# Patient Record
Sex: FEMALE | Race: WHITE | NOT HISPANIC OR LATINO | Employment: OTHER | URBAN - METROPOLITAN AREA
[De-identification: names, ages, dates, MRNs, and addresses within clinical notes are randomized per-mention and may not be internally consistent; named-entity substitution may affect disease eponyms.]

---

## 2023-04-14 ENCOUNTER — TELEPHONE (OUTPATIENT)
Dept: PRIMARY CARE | Facility: CLINIC | Age: 88
End: 2023-04-14
Payer: MEDICARE

## 2023-04-14 DIAGNOSIS — F41.9 ANXIETY: Primary | ICD-10-CM

## 2023-04-14 RX ORDER — ONDANSETRON HYDROCHLORIDE 8 MG/1
8 TABLET, FILM COATED ORAL EVERY 8 HOURS PRN
COMMUNITY
Start: 2022-12-28

## 2023-04-14 RX ORDER — OLANZAPINE 2.5 MG/1
1 TABLET ORAL DAILY
COMMUNITY
Start: 2023-01-18 | End: 2023-04-21 | Stop reason: SDUPTHER

## 2023-04-14 NOTE — TELEPHONE ENCOUNTER
Pharmacy called to ask about the strength of the fluoxitine prescribed for pt.  Please call to discuss.

## 2023-05-05 RX ORDER — OLANZAPINE 2.5 MG/1
2.5 TABLET ORAL DAILY
Qty: 90 TABLET | Refills: 1 | Status: SHIPPED | OUTPATIENT
Start: 2023-05-05 | End: 2023-05-08 | Stop reason: ALTCHOICE

## 2023-05-08 ENCOUNTER — OFFICE VISIT (OUTPATIENT)
Dept: PRIMARY CARE | Facility: CLINIC | Age: 88
End: 2023-05-08
Payer: MEDICARE

## 2023-05-08 VITALS
OXYGEN SATURATION: 97 % | RESPIRATION RATE: 18 BRPM | BODY MASS INDEX: 21.66 KG/M2 | HEART RATE: 84 BPM | HEIGHT: 67 IN | TEMPERATURE: 98 F | WEIGHT: 138 LBS | SYSTOLIC BLOOD PRESSURE: 104 MMHG | DIASTOLIC BLOOD PRESSURE: 64 MMHG

## 2023-05-08 DIAGNOSIS — F41.9 ANXIETY: Primary | ICD-10-CM

## 2023-05-08 PROCEDURE — 1036F TOBACCO NON-USER: CPT | Performed by: FAMILY MEDICINE

## 2023-05-08 PROCEDURE — 99213 OFFICE O/P EST LOW 20 MIN: CPT | Performed by: FAMILY MEDICINE

## 2023-05-08 PROCEDURE — 1159F MED LIST DOCD IN RCRD: CPT | Performed by: FAMILY MEDICINE

## 2023-05-08 RX ORDER — FLUOXETINE HYDROCHLORIDE 20 MG/1
20 CAPSULE ORAL DAILY
COMMUNITY
Start: 2023-01-12 | End: 2023-05-08 | Stop reason: ALTCHOICE

## 2023-05-08 RX ORDER — CALCITRIOL 0.25 UG/1
0.25 CAPSULE ORAL DAILY
COMMUNITY
Start: 2023-04-28

## 2023-05-08 RX ORDER — METOPROLOL TARTRATE 25 MG/1
25 TABLET, FILM COATED ORAL 2 TIMES DAILY
COMMUNITY
Start: 2018-07-25

## 2023-05-08 RX ORDER — SERTRALINE HYDROCHLORIDE 50 MG/1
50 TABLET, FILM COATED ORAL DAILY
Qty: 30 TABLET | Refills: 1 | Status: SHIPPED | OUTPATIENT
Start: 2023-05-08 | End: 2023-06-12 | Stop reason: SINTOL

## 2023-05-08 NOTE — PROGRESS NOTES
"Subjective   Patient ID: Sue Hutchinson is a 88 y.o. female who presents for Weight Loss, Nausea, no apetite, and Anxiety (Anxiety meds could be affecting apetite.).      HPI Thinks all of her symptoms are side effects from the SSRI. Is interested in trying a different one.    Review of Systems As per HPI    Objective   /64   Pulse 84   Temp 36.7 °C (98 °F)   Resp 18   Ht 1.702 m (5' 7\")   Wt 62.6 kg (138 lb)   SpO2 97%   BMI 21.61 kg/m²     Physical Exam  Constitutional:       Appearance: Normal appearance.   HENT:      Head: Normocephalic and atraumatic.      Right Ear: Tympanic membrane normal.      Left Ear: Tympanic membrane normal.      Nose: Nose normal.      Mouth/Throat:      Mouth: Mucous membranes are moist.   Cardiovascular:      Rate and Rhythm: Normal rate. Rhythm irregular.   Pulmonary:      Effort: Pulmonary effort is normal.      Breath sounds: Normal breath sounds.   Neurological:      Mental Status: She is alert.         Assessment/Plan   Problem List Items Addressed This Visit    None  Visit Diagnoses       Anxiety    -  Primary    Relevant Medications    sertraline (Zoloft) 50 mg tablet               "

## 2023-05-17 ENCOUNTER — NURSING HOME VISIT (OUTPATIENT)
Dept: PRIMARY CARE | Facility: CLINIC | Age: 88
End: 2023-05-17

## 2023-05-17 ENCOUNTER — NURSING HOME VISIT (OUTPATIENT)
Dept: POST ACUTE CARE | Facility: EXTERNAL LOCATION | Age: 88
End: 2023-05-17
Payer: MEDICARE

## 2023-05-17 DIAGNOSIS — N18.4 STAGE 4 CHRONIC KIDNEY DISEASE (MULTI): ICD-10-CM

## 2023-05-17 DIAGNOSIS — F32.A DEPRESSION, UNSPECIFIED DEPRESSION TYPE: ICD-10-CM

## 2023-05-17 DIAGNOSIS — C67.9 MALIGNANT NEOPLASM OF URINARY BLADDER, UNSPECIFIED SITE (MULTI): ICD-10-CM

## 2023-05-17 DIAGNOSIS — I48.91 ATRIAL FIBRILLATION, UNSPECIFIED TYPE (MULTI): ICD-10-CM

## 2023-05-17 DIAGNOSIS — M19.90 OSTEOARTHRITIS, UNSPECIFIED OSTEOARTHRITIS TYPE, UNSPECIFIED SITE: ICD-10-CM

## 2023-05-17 DIAGNOSIS — Z93.6 PRESENCE OF UROSTOMY (MULTI): ICD-10-CM

## 2023-05-17 DIAGNOSIS — I10 HYPERTENSION, UNSPECIFIED TYPE: ICD-10-CM

## 2023-05-17 DIAGNOSIS — F41.1 GAD (GENERALIZED ANXIETY DISORDER): ICD-10-CM

## 2023-05-17 DIAGNOSIS — R33.9 URINARY RETENTION: Primary | ICD-10-CM

## 2023-05-17 DIAGNOSIS — Z79.01 CURRENT USE OF LONG TERM ANTICOAGULATION: ICD-10-CM

## 2023-05-17 PROCEDURE — 99306 1ST NF CARE HIGH MDM 50: CPT | Performed by: FAMILY MEDICINE

## 2023-05-17 NOTE — PROGRESS NOTES
Documentation at [facility] skilled nursing [facility]    Problem List Items Addressed This Visit          Circulatory    Atrial fibrillation (CMS/HCC)    Hypertension       Genitourinary    Urinary retention - Primary    Stage 4 chronic kidney disease (CMS/HCC)    Malignant neoplasm of urinary bladder (CMS/HCC)       Other    ALICIA (generalized anxiety disorder)    Depression    Current use of long term anticoagulation    Presence of urostomy (CMS/HCC)   Discussed with daughter

## 2023-05-17 NOTE — LETTER
Patient: Sue Hutchinson  : 1934    Encounter Date: 2023    Documentation at [facility]    Problem List Items Addressed This Visit          Circulatory    Atrial fibrillation (CMS/HCC)    Hypertension       Genitourinary    Urinary retention - Primary    Stage 4 chronic kidney disease (CMS/HCC)    Malignant neoplasm of urinary bladder (CMS/HCC)       Other    ALICIA (generalized anxiety disorder)    Depression    Current use of long term anticoagulation    Presence of urostomy (CMS/HCC)   Discussed with daughter      Electronically Signed By: Jag Carbajal DO   23  1:36 PM

## 2023-05-25 ENCOUNTER — TELEPHONE (OUTPATIENT)
Dept: PRIMARY CARE | Facility: CLINIC | Age: 88
End: 2023-05-25
Payer: MEDICARE

## 2023-05-25 NOTE — TELEPHONE ENCOUNTER
Cathy from [facility] healthcare is calling because PT is being discharged from [facility] today and they would like to know if Dr. Carreno will follow for home helathcare services of nursing,  PT and OT.  Looking for a verbal order .  They want to start services tomorrow or Saturday.

## 2023-05-26 ENCOUNTER — PATIENT OUTREACH (OUTPATIENT)
Dept: PRIMARY CARE | Facility: CLINIC | Age: 88
End: 2023-05-26
Payer: MEDICARE

## 2023-05-26 ENCOUNTER — DOCUMENTATION (OUTPATIENT)
Dept: PRIMARY CARE | Facility: CLINIC | Age: 88
End: 2023-05-26
Payer: MEDICARE

## 2023-05-26 NOTE — PROGRESS NOTES
Discharge Facility:[facility]  Discharge Diagnosis: Acute kidney injury, Bladder cancer, Urinary   retention   Admission Date:5/16/2023  Discharge Date: 5/25/2023    PCP Appointment Date:none  Specialist Appointment Date: none  Hospital Encounter and Summary:  not available at this time

## 2023-06-07 ENCOUNTER — PATIENT OUTREACH (OUTPATIENT)
Dept: PRIMARY CARE | Facility: CLINIC | Age: 88
End: 2023-06-07
Payer: MEDICARE

## 2023-06-07 NOTE — PROGRESS NOTES
Unable to reach patient for call back 14 days after patient's hospital discharge. No follow up appointment with PCP.   LVM with call back number for patient to call if needed   If no voicemail available call attempts x 2 were made to contact the patient to assist with any questions or concerns patient may have.

## 2023-06-12 ENCOUNTER — OFFICE VISIT (OUTPATIENT)
Dept: PRIMARY CARE | Facility: CLINIC | Age: 88
End: 2023-06-12
Payer: MEDICARE

## 2023-06-12 VITALS
RESPIRATION RATE: 16 BRPM | OXYGEN SATURATION: 97 % | BODY MASS INDEX: 22.6 KG/M2 | TEMPERATURE: 97.9 F | WEIGHT: 144 LBS | SYSTOLIC BLOOD PRESSURE: 114 MMHG | DIASTOLIC BLOOD PRESSURE: 64 MMHG | HEIGHT: 67 IN | HEART RATE: 80 BPM

## 2023-06-12 DIAGNOSIS — Z79.01 CURRENT USE OF LONG TERM ANTICOAGULATION: ICD-10-CM

## 2023-06-12 DIAGNOSIS — F32.A DEPRESSION, UNSPECIFIED DEPRESSION TYPE: ICD-10-CM

## 2023-06-12 DIAGNOSIS — I48.11 LONGSTANDING PERSISTENT ATRIAL FIBRILLATION (MULTI): Primary | ICD-10-CM

## 2023-06-12 DIAGNOSIS — I10 HYPERTENSION, UNSPECIFIED TYPE: ICD-10-CM

## 2023-06-12 PROCEDURE — 99213 OFFICE O/P EST LOW 20 MIN: CPT | Performed by: FAMILY MEDICINE

## 2023-06-12 PROCEDURE — 1159F MED LIST DOCD IN RCRD: CPT | Performed by: FAMILY MEDICINE

## 2023-06-12 PROCEDURE — 3078F DIAST BP <80 MM HG: CPT | Performed by: FAMILY MEDICINE

## 2023-06-12 PROCEDURE — 1036F TOBACCO NON-USER: CPT | Performed by: FAMILY MEDICINE

## 2023-06-12 PROCEDURE — 3074F SYST BP LT 130 MM HG: CPT | Performed by: FAMILY MEDICINE

## 2023-06-12 RX ORDER — MIRTAZAPINE 7.5 MG/1
7.5 TABLET, FILM COATED ORAL DAILY
Qty: 90 TABLET | Refills: 3 | Status: SHIPPED | OUTPATIENT
Start: 2023-06-12 | End: 2024-06-11

## 2023-06-12 RX ORDER — SODIUM BICARBONATE 650 MG/1
TABLET ORAL
COMMUNITY

## 2023-06-12 RX ORDER — SERTRALINE HYDROCHLORIDE 25 MG/1
25 TABLET, FILM COATED ORAL DAILY
Qty: 90 TABLET | Refills: 3 | Status: SHIPPED | OUTPATIENT
Start: 2023-06-12 | End: 2024-06-11

## 2023-06-12 RX ORDER — MIRTAZAPINE 7.5 MG/1
7.5 TABLET, FILM COATED ORAL DAILY
COMMUNITY
Start: 2023-05-25 | End: 2023-06-12 | Stop reason: SDUPTHER

## 2023-06-12 RX ORDER — SERTRALINE HYDROCHLORIDE 25 MG/1
25 TABLET, FILM COATED ORAL DAILY
COMMUNITY
Start: 2023-05-25 | End: 2023-06-12 | Stop reason: SDUPTHER

## 2023-06-12 NOTE — PROGRESS NOTES
"Subjective   Patient ID: Sue Hutchinson is a 88 y.o. female who presents for Hospital Follow-up (Failure to thrive , Urinary retention, Atrial fibrillation,  Hypertension and depression. /).    HPI In for three days. Did not have PT see her and she was like \"a wet noodle\" Was sent to [facility] in  for rehab afterwards. Is able to get up a few steps.       Objective   /64 (BP Location: Left arm, Patient Position: Sitting)   Pulse 80   Temp 36.6 °C (97.9 °F)   Resp 16   Ht 1.702 m (5' 7\")   Wt 65.3 kg (144 lb)   SpO2 97%   BMI 22.55 kg/m²     Physical Exam  Constitutional:       Appearance: Normal appearance.   HENT:      Head: Normocephalic and atraumatic.      Right Ear: Tympanic membrane normal.      Left Ear: Tympanic membrane normal.      Nose: Nose normal.      Mouth/Throat:      Mouth: Mucous membranes are moist.   Cardiovascular:      Rate and Rhythm: Normal rate and regular rhythm.   Pulmonary:      Effort: Pulmonary effort is normal.      Breath sounds: Normal breath sounds.   Neurological:      Mental Status: She is alert.         Assessment/Plan   Problem List Items Addressed This Visit       Atrial fibrillation (CMS/HCC) - Primary    Hypertension    Depression    Relevant Medications    mirtazapine (Remeron) 7.5 mg tablet    sertraline (Zoloft) 25 mg tablet    Current use of long term anticoagulation          "

## 2023-07-07 ENCOUNTER — PATIENT OUTREACH (OUTPATIENT)
Dept: PRIMARY CARE | Facility: CLINIC | Age: 88
End: 2023-07-07
Payer: MEDICARE

## 2023-07-17 LAB
ALBUMIN (G/DL) IN SER/PLAS: 3.8 G/DL (ref 3.4–5)
AMORPHOUS CRYSTALS, URINE: ABNORMAL /HPF
ANION GAP IN SER/PLAS: 13 MMOL/L (ref 10–20)
BACTERIA, URINE: ABNORMAL /HPF
BASOPHILS (10*3/UL) IN BLOOD BY AUTOMATED COUNT: 0.08 X10E9/L (ref 0–0.1)
BASOPHILS/100 LEUKOCYTES IN BLOOD BY AUTOMATED COUNT: 1.1 % (ref 0–2)
CALCIUM (MG/DL) IN SER/PLAS: 9.5 MG/DL (ref 8.6–10.3)
CARBON DIOXIDE, TOTAL (MMOL/L) IN SER/PLAS: 28 MMOL/L (ref 21–32)
CHLORIDE (MMOL/L) IN SER/PLAS: 105 MMOL/L (ref 98–107)
CREATININE (MG/DL) IN SER/PLAS: 1.54 MG/DL (ref 0.5–1.05)
EOSINOPHILS (10*3/UL) IN BLOOD BY AUTOMATED COUNT: 0.29 X10E9/L (ref 0–0.4)
EOSINOPHILS/100 LEUKOCYTES IN BLOOD BY AUTOMATED COUNT: 3.8 % (ref 0–6)
ERYTHROCYTE DISTRIBUTION WIDTH (RATIO) BY AUTOMATED COUNT: 12 % (ref 11.5–14.5)
ERYTHROCYTE MEAN CORPUSCULAR HEMOGLOBIN CONCENTRATION (G/DL) BY AUTOMATED: 31.8 G/DL (ref 32–36)
ERYTHROCYTE MEAN CORPUSCULAR VOLUME (FL) BY AUTOMATED COUNT: 93 FL (ref 80–100)
ERYTHROCYTES (10*6/UL) IN BLOOD BY AUTOMATED COUNT: 4.29 X10E12/L (ref 4–5.2)
GFR FEMALE: 32 ML/MIN/1.73M2
GLUCOSE (MG/DL) IN SER/PLAS: 101 MG/DL (ref 74–99)
HEMATOCRIT (%) IN BLOOD BY AUTOMATED COUNT: 40 % (ref 36–46)
HEMOGLOBIN (G/DL) IN BLOOD: 12.7 G/DL (ref 12–16)
IMMATURE GRANULOCYTES/100 LEUKOCYTES IN BLOOD BY AUTOMATED COUNT: 0.3 % (ref 0–0.9)
LEUKOCYTES (10*3/UL) IN BLOOD BY AUTOMATED COUNT: 7.6 X10E9/L (ref 4.4–11.3)
LYMPHOCYTES (10*3/UL) IN BLOOD BY AUTOMATED COUNT: 1.57 X10E9/L (ref 0.8–3)
LYMPHOCYTES/100 LEUKOCYTES IN BLOOD BY AUTOMATED COUNT: 20.7 % (ref 13–44)
MAGNESIUM (MG/DL) IN SER/PLAS: 1.89 MG/DL (ref 1.6–2.4)
MONOCYTES (10*3/UL) IN BLOOD BY AUTOMATED COUNT: 0.92 X10E9/L (ref 0.05–0.8)
MONOCYTES/100 LEUKOCYTES IN BLOOD BY AUTOMATED COUNT: 12.1 % (ref 2–10)
NEUTROPHILS (10*3/UL) IN BLOOD BY AUTOMATED COUNT: 4.71 X10E9/L (ref 1.6–5.5)
NEUTROPHILS/100 LEUKOCYTES IN BLOOD BY AUTOMATED COUNT: 62 % (ref 40–80)
PHOSPHATE (MG/DL) IN SER/PLAS: 3.7 MG/DL (ref 2.5–4.9)
PLATELETS (10*3/UL) IN BLOOD AUTOMATED COUNT: 300 X10E9/L (ref 150–450)
POTASSIUM (MMOL/L) IN SER/PLAS: 4.2 MMOL/L (ref 3.5–5.3)
RBC, URINE: 7 /HPF (ref 0–5)
SODIUM (MMOL/L) IN SER/PLAS: 142 MMOL/L (ref 136–145)
SQUAMOUS EPITHELIAL CELLS, URINE: <1 /HPF
URATE (MG/DL) IN SER/PLAS: 6.2 MG/DL (ref 2.3–6.7)
UREA NITROGEN (MG/DL) IN SER/PLAS: 28 MG/DL (ref 6–23)
WBC CLUMPS, URINE: ABNORMAL /HPF
WBC, URINE: 80 /HPF (ref 0–5)

## 2023-08-25 ENCOUNTER — PATIENT OUTREACH (OUTPATIENT)
Dept: PRIMARY CARE | Facility: CLINIC | Age: 88
End: 2023-08-25
Payer: MEDICARE

## 2023-10-05 ENCOUNTER — LAB (OUTPATIENT)
Dept: LAB | Facility: LAB | Age: 88
End: 2023-10-05
Payer: MEDICARE

## 2023-10-05 DIAGNOSIS — N18.2 CHRONIC KIDNEY DISEASE, STAGE 2 (MILD): Primary | ICD-10-CM

## 2023-10-05 LAB
ALBUMIN SERPL BCP-MCNC: 3.8 G/DL (ref 3.4–5)
ANION GAP SERPL CALC-SCNC: 12 MMOL/L (ref 10–20)
BASOPHILS # BLD AUTO: 0.08 X10*3/UL (ref 0–0.1)
BASOPHILS NFR BLD AUTO: 1 %
BUN SERPL-MCNC: 28 MG/DL (ref 6–23)
CALCIUM SERPL-MCNC: 9.6 MG/DL (ref 8.6–10.3)
CHLORIDE SERPL-SCNC: 111 MMOL/L (ref 98–107)
CO2 SERPL-SCNC: 28 MMOL/L (ref 21–32)
CREAT SERPL-MCNC: 1.56 MG/DL (ref 0.5–1.05)
EOSINOPHIL # BLD AUTO: 0.31 X10*3/UL (ref 0–0.4)
EOSINOPHIL NFR BLD AUTO: 3.9 %
ERYTHROCYTE [DISTWIDTH] IN BLOOD BY AUTOMATED COUNT: 13.2 % (ref 11.5–14.5)
GFR SERPL CREATININE-BSD FRML MDRD: 32 ML/MIN/1.73M*2
GLUCOSE SERPL-MCNC: 105 MG/DL (ref 74–99)
HCT VFR BLD AUTO: 40.8 % (ref 36–46)
HGB BLD-MCNC: 12.9 G/DL (ref 12–16)
IMM GRANULOCYTES # BLD AUTO: 0.03 X10*3/UL (ref 0–0.5)
IMM GRANULOCYTES NFR BLD AUTO: 0.4 % (ref 0–0.9)
LYMPHOCYTES # BLD AUTO: 1.5 X10*3/UL (ref 0.8–3)
LYMPHOCYTES NFR BLD AUTO: 18.6 %
MAGNESIUM SERPL-MCNC: 1.95 MG/DL (ref 1.6–2.4)
MCH RBC QN AUTO: 28.9 PG (ref 26–34)
MCHC RBC AUTO-ENTMCNC: 31.6 G/DL (ref 32–36)
MCV RBC AUTO: 92 FL (ref 80–100)
MONOCYTES # BLD AUTO: 0.78 X10*3/UL (ref 0.05–0.8)
MONOCYTES NFR BLD AUTO: 9.7 %
NEUTROPHILS # BLD AUTO: 5.35 X10*3/UL (ref 1.6–5.5)
NEUTROPHILS NFR BLD AUTO: 66.4 %
NRBC BLD-RTO: 0 /100 WBCS (ref 0–0)
PHOSPHATE SERPL-MCNC: 4 MG/DL (ref 2.5–4.9)
PLATELET # BLD AUTO: 317 X10*3/UL (ref 150–450)
PMV BLD AUTO: 11.6 FL (ref 7.5–11.5)
POTASSIUM SERPL-SCNC: 4.5 MMOL/L (ref 3.5–5.3)
RBC # BLD AUTO: 4.46 X10*6/UL (ref 4–5.2)
SODIUM SERPL-SCNC: 146 MMOL/L (ref 136–145)
URATE SERPL-MCNC: 6 MG/DL (ref 2.3–6.7)
WBC # BLD AUTO: 8.1 X10*3/UL (ref 4.4–11.3)

## 2023-10-05 PROCEDURE — 85025 COMPLETE CBC W/AUTO DIFF WBC: CPT

## 2023-10-05 PROCEDURE — 36415 COLL VENOUS BLD VENIPUNCTURE: CPT

## 2023-10-05 PROCEDURE — 80069 RENAL FUNCTION PANEL: CPT

## 2023-10-05 PROCEDURE — 83735 ASSAY OF MAGNESIUM: CPT

## 2023-10-05 PROCEDURE — 84550 ASSAY OF BLOOD/URIC ACID: CPT

## 2023-10-27 ENCOUNTER — TELEPHONE (OUTPATIENT)
Dept: PRIMARY CARE | Facility: CLINIC | Age: 88
End: 2023-10-27
Payer: MEDICARE

## 2023-10-27 NOTE — TELEPHONE ENCOUNTER
Pt states that Dr. Carreno referred her to Dr. Torres for plastic surgery referral. Pt says there are multiple doctors with that name and is requesting more detail so she can schedule with the correct person. Thank you.       [phone]

## 2023-11-02 ENCOUNTER — TELEPHONE (OUTPATIENT)
Dept: PRIMARY CARE | Facility: CLINIC | Age: 88
End: 2023-11-02
Payer: MEDICARE

## 2023-11-02 NOTE — TELEPHONE ENCOUNTER
Per pt, she was unable to find the plastic surgeon with the last name Sofia, there are quite a few doctors with very similar spelling with the same specialty. She is asking for his full name and location, please advise.   Tel [phone]

## 2023-11-05 ENCOUNTER — PHARMACY VISIT (OUTPATIENT)
Dept: PHARMACY | Facility: CLINIC | Age: 88
End: 2023-11-05
Payer: COMMERCIAL

## 2023-11-14 ENCOUNTER — TELEMEDICINE (OUTPATIENT)
Dept: PHARMACY | Facility: HOSPITAL | Age: 88
End: 2023-11-14
Payer: MEDICARE

## 2023-11-14 DIAGNOSIS — I48.11 LONGSTANDING PERSISTENT ATRIAL FIBRILLATION (MULTI): Primary | ICD-10-CM

## 2023-11-14 NOTE — Clinical Note
JIN Wood! My resident completed 3 month follow up with patient regarding Eliquis. No issues to report. Will continue to follow

## 2023-11-14 NOTE — PROGRESS NOTES
"Pharmacist Clinic: Anticoagulation Management  Sue Hutchinson was referred to the Clinical Pharmacy Team for her anticoagulation management.    Referring Provider:  Tracy Schwab    Last Appointment w/ Pharmacist: 7/14/2023  Pharmacist Name: Tammy Garrett  _______________________________________________________________________  PHARMACY ASSESSMENT    Review of Past Appointment:   - At last appointment with patient's daughter (patient is hard of hearing), patient was screened for and enrolled in [facility] for Eliquis as it was becoming too expensive.    RELEVANT LAB RESULTS  Lab Results   Component Value Date    BILITOT 2.5 (H) 05/11/2023    CALCIUM 9.6 10/05/2023    CO2 28 10/05/2023     (H) 10/05/2023    CREATININE 1.56 (H) 10/05/2023    GLUCOSE 105 (H) 10/05/2023    ALKPHOS 37 05/11/2023    K 4.5 10/05/2023    PROT 6.7 05/11/2023     (H) 10/05/2023    AST 17 05/11/2023    ALT 13 05/11/2023    BUN 28 (H) 10/05/2023    ANIONGAP 12 10/05/2023    MG 1.95 10/05/2023    PHOS 4.0 10/05/2023    ALBUMIN 3.8 10/05/2023    GFRF 32 (A) 07/17/2023     Lab Results   Component Value Date    TRIG 195 (H) 03/09/2021    CHOL 149 03/09/2021    HDL 31.0 (A) 03/09/2021     No results found for: \"BMCBC\", \"CBCDIF\"   _______________________________________________________________________  ANTICOAGULATION ASSESSMENT    The ASCVD Risk score (Alexandra RAMEY, et al., 2019) failed to calculate for the following reasons:    The 2019 ASCVD risk score is only valid for ages 40 to 79    DIAGNOSIS: prevention of nonvalvular atrial fibrilliation stroke and systemic embolism  - Patient is projected to be on anticoagulation indefinitely   - [id number] Score: [4] (only included if diagnosis is atrial fibrillation)   Age: [<65 (0)] [65-74 (+1)] [> 75 (+2)]: 2  Sex: [Male/Female (+1)]: 1  CHF history: [No/Yes(+1)]: 0  Hypertension history: [No/Yes(+1)]: 1  Stroke/TIA/thromboembolism history: [No/Yes(+2)]: 0  Vascular disease history (prior MI, " peripheral artery disease, aortic plaque): [No/Yes(+1)]: 0  Diabetes history: [No/Yes(+1)]: 0    CURRENT PHARMACOTHERAPY:   - Eliquis 2.5mg 1 tablet by mouth twice daily  - Dosage is appropriate for patient's age (89), weight (64.5kg), and renal function (serum creatinine 1.54 mg/dL)    UPDATE ON PHARMACOTHERAPY:   Affordability/Accessibility: [facility]  Adherence/Organization: denies issues  Adverse Effects: denies bleeding/bruising   Recent Hospitalizations: none  Recent Falls/Trauma: none  Changes in Tobacco or Alcohol Intake: none    DISCUSSION/NOTES:  - Spoke with patient's daughter since patient is hard of hearing. States patient is doing well on Eliquis and denies any abnormal bleeding or bruising. States she is not sure if they get the Eliquis delivered or if it is picked up, but denies issues with obtaining medication through [facility].   _______________________________________________________________________  PATIENT EDUCATION/GOALS  - Counseled patient on MOA, expectations, duration of therapy, contraindications, administration, and monitoring parameters  - Counseled patient of side effects that are indicative of bleeding such as dark tarry stool, unexplainable bruising, or vomiting up a coffee ground like substance  - Answered all patient questions and concerns  _______________________________________________________________________  RECOMMENDATIONS/PLAN  1. Patient is doing well on Eliquis and daughter states she is not having any abnormal bruising or bleeding. Will follow up in 3 months.     Next Cardiology Appointment: To be scheduled  Clinical Pharmacist follow up: 2/13/2024  VAF/Application Expiration: Yes    Date: 8/14/2024  Type of Encounter: Bay Aguilar  PGY-1 Pharmacy Resident, [facility]     Verbal consent to manage patient's drug therapy was obtained from the patient . They were informed they may decline to participate or withdraw from participation in pharmacy services at any  time.    Continue all meds under the continuation of care with the referring provider and clinical pharmacy team.

## 2023-11-17 ENCOUNTER — PHARMACY VISIT (OUTPATIENT)
Dept: PHARMACY | Facility: CLINIC | Age: 88
End: 2023-11-17
Payer: MEDICARE

## 2023-11-17 PROCEDURE — RXMED WILLOW AMBULATORY MEDICATION CHARGE

## 2024-02-09 PROCEDURE — RXMED WILLOW AMBULATORY MEDICATION CHARGE

## 2024-02-13 ENCOUNTER — TELEMEDICINE (OUTPATIENT)
Dept: PHARMACY | Facility: HOSPITAL | Age: 89
End: 2024-02-13
Payer: MEDICARE

## 2024-02-13 ENCOUNTER — PHARMACY VISIT (OUTPATIENT)
Dept: PHARMACY | Facility: CLINIC | Age: 89
End: 2024-02-13
Payer: MEDICARE

## 2024-02-13 DIAGNOSIS — I48.11 LONGSTANDING PERSISTENT ATRIAL FIBRILLATION (MULTI): ICD-10-CM

## 2024-02-13 NOTE — Clinical Note
Hemesha Wood! My resident completed a follow up with Sue regarding her Eliquis.  Patient denies adverse effects associated with Eliquis. Patient also denied recent falls and hospitalizations. Overall, patient is doing well on her anticoagulation (Eliquis). Will continue to follow.

## 2024-02-13 NOTE — PROGRESS NOTES
"Pharmacist Clinic: Anticoagulation Management  Sue Hutchinson was referred to the Clinical Pharmacy Team for her anticoagulation management.    Referring Provider:  Tracy Schwab    Last Appointment w/ Pharmacist: 11/14/2023  Pharmacist Name: Caren Cintron, PharmD  _______________________________________________________________________  PHARMACY ASSESSMENT    Review of Past Appointment:   - Patient is doing well on Eliquis and denied any abnormal bleeding or bruising. Patient denied issues with obtaining medication through  PAP.     RELEVANT LAB RESULTS  Lab Results   Component Value Date    BILITOT 2.5 (H) 05/11/2023    CALCIUM 9.6 10/05/2023    CO2 28 10/05/2023     (H) 10/05/2023    CREATININE 1.56 (H) 10/05/2023    GLUCOSE 105 (H) 10/05/2023    ALKPHOS 37 05/11/2023    K 4.5 10/05/2023    PROT 6.7 05/11/2023     (H) 10/05/2023    AST 17 05/11/2023    ALT 13 05/11/2023    BUN 28 (H) 10/05/2023    ANIONGAP 12 10/05/2023    MG 1.95 10/05/2023    PHOS 4.0 10/05/2023    ALBUMIN 3.8 10/05/2023    GFRF 32 (A) 07/17/2023     Lab Results   Component Value Date    TRIG 195 (H) 03/09/2021    CHOL 149 03/09/2021    HDL 31.0 (A) 03/09/2021     No results found for: \"BMCBC\", \"CBCDIF\"   _______________________________________________________________________  ANTICOAGULATION ASSESSMENT    The ASCVD Risk score (Alexandra RAMEY, et al., 2019) failed to calculate for the following reasons:    The 2019 ASCVD risk score is only valid for ages 40 to 79    DIAGNOSIS: prevention of nonvalvular atrial fibrilliation stroke and systemic embolism  - Patient is projected to be on anticoagulation indefinitely  - [id number] Score: [4] (only included if diagnosis is atrial fibrillation)   Age: [<65 (0)] [65-74 (+1)] [> 75 (+2)]: 2  Sex: [Male/Female (+1)]: 1  CHF history: [No/Yes(+1)]: 0  Hypertension history: [No/Yes(+1)]: 1  Stroke/TIA/thromboembolism history: [No/Yes(+2)]: 0  Vascular disease history (prior MI, peripheral artery " disease, aortic plaque): [No/Yes(+1)]: 0  Diabetes history: [No/Yes(+1)]: 0    CURRENT PHARMACOTHERAPY:   - Eliquis 2.5 mg BID  - 88 yo  - 64.5 kg  - Scr: 1.56  - eGFR: 32    UPDATE ON PHARMACOTHERAPY:   Affordability/Accessibility: Patient receives Eliquis through [facility]  Adherence/Organization: Patient stated she forgets to take medication twice a month  Adverse Effects: None  Recent Hospitalizations: None  Recent Falls/Trauma: None  Changes in Tobacco or Alcohol Intake: N/a    DISCUSSION/NOTES:  - Patient denies adverse effects associated with Eliquis. Patient also denied recent falls and hospitalizations. Overall, patient is doing well on her anticoagulation (Eliquis).   _______________________________________________________________________  PATIENT EDUCATION/GOALS  - Counseled patient on MOA, expectations, duration of therapy, contraindications, administration, and monitoring parameters  - Counseled patient of side effects that are indicative of bleeding such as dark tarry stool, unexplainable bruising, or vomiting up a coffee ground like substance  - Answered all patient questions and concerns  _______________________________________________________________________  RECOMMENDATIONS/PLAN  1. Continue Eliquis 2.5 mg BID    Next Cardiology Appointment: TBD  Clinical Pharmacist follow up: 5/13/2024  VAF/Application Expiration: Yes    Date: 8/14/2024  Type of Encounter: Nathan Alvarez PharmD  PGY-1 Pharmacy Resident    Verbal consent to manage patient's drug therapy was obtained from the patient . They were informed they may decline to participate or withdraw from participation in pharmacy services at any time.    Continue all meds under the continuation of care with the referring provider and clinical pharmacy team.

## 2024-05-09 PROCEDURE — RXMED WILLOW AMBULATORY MEDICATION CHARGE

## 2024-05-13 ENCOUNTER — APPOINTMENT (OUTPATIENT)
Dept: PHARMACY | Facility: HOSPITAL | Age: 89
End: 2024-05-13
Payer: MEDICARE

## 2024-05-15 ENCOUNTER — PHARMACY VISIT (OUTPATIENT)
Dept: PHARMACY | Facility: CLINIC | Age: 89
End: 2024-05-15
Payer: MEDICARE

## 2024-05-29 ENCOUNTER — TELEMEDICINE (OUTPATIENT)
Dept: PHARMACY | Facility: HOSPITAL | Age: 89
End: 2024-05-29
Payer: MEDICARE

## 2024-05-29 DIAGNOSIS — I48.11 LONGSTANDING PERSISTENT ATRIAL FIBRILLATION (MULTI): Primary | ICD-10-CM

## 2024-05-29 NOTE — PROGRESS NOTES
"Pharmacist Clinic: Anticoagulation Management  Sue Hutchinson was referred to the Clinical Pharmacy Team for her anticoagulation management.    Referring Provider:  Tracy Schwab, CNP    Last Appointment w/ Pharmacist: 2/13/2024  Pharmacist Name: Alyson Alvarez, PharmD  _______________________________________________________________________  PHARMACY ASSESSMENT    Review of Past Appointment:    PAP approval expires 8/14/2024  Patient doing well overall, reports she may miss a dose of medication twice per month     RELEVANT LAB RESULTS  Lab Results   Component Value Date    BILITOT 2.5 (H) 05/11/2023    CALCIUM 9.6 10/05/2023    CO2 28 10/05/2023     (H) 10/05/2023    CREATININE 1.56 (H) 10/05/2023    GLUCOSE 105 (H) 10/05/2023    ALKPHOS 37 05/11/2023    K 4.5 10/05/2023    PROT 6.7 05/11/2023     (H) 10/05/2023    AST 17 05/11/2023    ALT 13 05/11/2023    BUN 28 (H) 10/05/2023    ANIONGAP 12 10/05/2023    MG 1.95 10/05/2023    PHOS 4.0 10/05/2023    ALBUMIN 3.8 10/05/2023    GFRF 32 (A) 07/17/2023     Lab Results   Component Value Date    TRIG 195 (H) 03/09/2021    CHOL 149 03/09/2021    HDL 31.0 (A) 03/09/2021     No results found for: \"BMCBC\", \"CBCDIF\"   _______________________________________________________________________  ANTICOAGULATION ASSESSMENT    The ASCVD Risk score (Alexandra RAMEY, et al., 2019) failed to calculate for the following reasons:    The 2019 ASCVD risk score is only valid for ages 40 to 79    DIAGNOSIS: prevention of nonvalvular atrial fibrilliation stroke and systemic embolism  - Patient is projected to be on anticoagulation long term  - [id number]-CNSD Score: [4] (only included if diagnosis is atrial fibrillation)   Age: [<65 (0)] [65-74 (+1)] [> 75 (+2)]: 2  Sex: [Male/Female (+1)]: 1  CHF history: [No/Yes(+1)]: 0  Hypertension history: [No/Yes(+1)]: 1  Stroke/TIA/thromboembolism history: [No/Yes(+2)]: 0  Vascular disease history (prior MI, peripheral artery disease, aortic " plaque): [No/Yes(+1)]: 0  Diabetes history: [No/Yes(+1)]: 0    CURRENT PHARMACOTHERAPY:   Eliquis 2.5mg BID  89 y/o  64.5 kg  Scr 1.56  Dose is appropriate for age and kidney function    UPDATE ON PHARMACOTHERAPY:   Affordability/Accessibility:  PAP for Eliquis   Adherence/Organization:  may miss 1 dose once a month   Adverse Effects: no bleeding or bruising   Recent Hospitalizations: none   Recent Falls/Trauma: none  Changes in Tobacco or Alcohol Intake: none    DISCUSSION/NOTES:  Uses walker for ambulation, due to hip pain, is careful to avoid falls, has not fallen since she started consistently using her walker, avoids activities that may increase her risk of falling  Takes short walk every day outside to maintain strength, does some light yard work  Overall patient is doing well on Eliquis, with no bleeding or bruising reported  Counseled on importance of adherence to anticoagulation, and risk of blood clots with missed doses, patient reports overall good compliance    _______________________________________________________________________  PATIENT EDUCATION/GOALS  - Counseled patient on MOA, expectations, duration of therapy, contraindications, administration, and monitoring parameters  - Counseled patient of side effects that are indicative of bleeding such as dark tarry stool, unexplainable bruising, or vomiting up a coffee ground like substance  - Answered all patient questions and concerns  - Discussed timeline for  PAP renewal, explained what documents will be required, timeline for renewal, patient would prefer to complete renewal herself  _______________________________________________________________________  RECOMMENDATIONS/PLAN  Continue: Eliquis 2.5 mg BID  Follow up with pharmacy in 2 months to assess for side effects and medication tolerance, early follow up in order to collect documents to renew  PAP     Refill for Eliquis 2.5mg sent to  [facility]     Next Cardiology Appointment: none  scheduled  Clinical Pharmacist follow up: 7/24/2024 @ 1:00  VAF/Application Expiration: Yes    Date: 8/14/2024  Type of Encounter: Nathan Huynh, PharmD, [facility]  PGY1 [facility] Pharmacy Resident     Verbal consent to manage patient's drug therapy was obtained from the patient . They were informed they may decline to participate or withdraw from participation in pharmacy services at any time.    Continue all meds under the continuation of care with the referring provider and clinical pharmacy team.

## 2024-05-29 NOTE — ASSESSMENT & PLAN NOTE
RECOMMENDATIONS/PLAN  Continue: Eliquis 2.5 mg BID  Follow up with pharmacy in 2 months to assess for side effects and medication tolerance, early follow up in order to collect documents to renew  PAP   Continue all meds under the continuation of care with the referring provider and clinical pharmacy team.

## 2024-06-11 DIAGNOSIS — F32.A DEPRESSION, UNSPECIFIED DEPRESSION TYPE: ICD-10-CM

## 2024-06-11 RX ORDER — MIRTAZAPINE 7.5 MG/1
7.5 TABLET, FILM COATED ORAL DAILY
Qty: 90 TABLET | Refills: 3 | Status: SHIPPED | OUTPATIENT
Start: 2024-06-11 | End: 2025-06-11

## 2024-07-17 ENCOUNTER — LAB (OUTPATIENT)
Dept: LAB | Facility: LAB | Age: 89
End: 2024-07-17
Payer: MEDICARE

## 2024-07-17 DIAGNOSIS — N17.9 ACUTE KIDNEY FAILURE, UNSPECIFIED (CMS-HCC): ICD-10-CM

## 2024-07-17 DIAGNOSIS — N18.30 CHRONIC KIDNEY DISEASE, STAGE 3 UNSPECIFIED (MULTI): Primary | ICD-10-CM

## 2024-07-17 LAB
BASOPHILS # BLD AUTO: 0.07 X10*3/UL (ref 0–0.1)
BASOPHILS NFR BLD AUTO: 0.8 %
EOSINOPHIL # BLD AUTO: 0.22 X10*3/UL (ref 0–0.4)
EOSINOPHIL NFR BLD AUTO: 2.6 %
ERYTHROCYTE [DISTWIDTH] IN BLOOD BY AUTOMATED COUNT: 13.1 % (ref 11.5–14.5)
HCT VFR BLD AUTO: 42.1 % (ref 36–46)
HGB BLD-MCNC: 13.4 G/DL (ref 12–16)
IMM GRANULOCYTES # BLD AUTO: 0.03 X10*3/UL (ref 0–0.5)
IMM GRANULOCYTES NFR BLD AUTO: 0.4 % (ref 0–0.9)
LYMPHOCYTES # BLD AUTO: 1.65 X10*3/UL (ref 0.8–3)
LYMPHOCYTES NFR BLD AUTO: 19.8 %
MCH RBC QN AUTO: 28 PG (ref 26–34)
MCHC RBC AUTO-ENTMCNC: 31.8 G/DL (ref 32–36)
MCV RBC AUTO: 88 FL (ref 80–100)
MONOCYTES # BLD AUTO: 0.75 X10*3/UL (ref 0.05–0.8)
MONOCYTES NFR BLD AUTO: 9 %
NEUTROPHILS # BLD AUTO: 5.62 X10*3/UL (ref 1.6–5.5)
NEUTROPHILS NFR BLD AUTO: 67.4 %
NRBC BLD-RTO: 0 /100 WBCS (ref 0–0)
PLATELET # BLD AUTO: 326 X10*3/UL (ref 150–450)
RBC # BLD AUTO: 4.79 X10*6/UL (ref 4–5.2)
WBC # BLD AUTO: 8.3 X10*3/UL (ref 4.4–11.3)

## 2024-07-17 PROCEDURE — 83970 ASSAY OF PARATHORMONE: CPT

## 2024-07-17 PROCEDURE — 36415 COLL VENOUS BLD VENIPUNCTURE: CPT

## 2024-07-17 PROCEDURE — 83735 ASSAY OF MAGNESIUM: CPT

## 2024-07-17 PROCEDURE — 85025 COMPLETE CBC W/AUTO DIFF WBC: CPT

## 2024-07-17 PROCEDURE — 84550 ASSAY OF BLOOD/URIC ACID: CPT

## 2024-07-17 PROCEDURE — 80069 RENAL FUNCTION PANEL: CPT

## 2024-07-18 LAB
ALBUMIN SERPL BCP-MCNC: 4 G/DL (ref 3.4–5)
ANION GAP SERPL CALC-SCNC: 15 MMOL/L (ref 10–20)
BUN SERPL-MCNC: 37 MG/DL (ref 6–23)
CALCIUM SERPL-MCNC: 9.9 MG/DL (ref 8.6–10.6)
CHLORIDE SERPL-SCNC: 108 MMOL/L (ref 98–107)
CO2 SERPL-SCNC: 23 MMOL/L (ref 21–32)
CREAT SERPL-MCNC: 1.62 MG/DL (ref 0.5–1.05)
EGFRCR SERPLBLD CKD-EPI 2021: 30 ML/MIN/1.73M*2
GLUCOSE SERPL-MCNC: 91 MG/DL (ref 74–99)
MAGNESIUM SERPL-MCNC: 1.97 MG/DL (ref 1.6–2.4)
PHOSPHATE SERPL-MCNC: 3.9 MG/DL (ref 2.5–4.9)
POTASSIUM SERPL-SCNC: 4.9 MMOL/L (ref 3.5–5.3)
PTH-INTACT SERPL-MCNC: 65.9 PG/ML (ref 18.5–88)
SODIUM SERPL-SCNC: 141 MMOL/L (ref 136–145)
URATE SERPL-MCNC: 7.6 MG/DL (ref 2.3–6.7)

## 2024-07-24 ENCOUNTER — APPOINTMENT (OUTPATIENT)
Dept: PHARMACY | Facility: HOSPITAL | Age: 89
End: 2024-07-24
Payer: MEDICARE

## 2024-07-24 ENCOUNTER — APPOINTMENT (OUTPATIENT)
Dept: PRIMARY CARE | Facility: CLINIC | Age: 89
End: 2024-07-24
Payer: MEDICARE

## 2024-07-24 VITALS
TEMPERATURE: 97.8 F | OXYGEN SATURATION: 96 % | HEART RATE: 68 BPM | HEIGHT: 67 IN | BODY MASS INDEX: 22.44 KG/M2 | RESPIRATION RATE: 18 BRPM | SYSTOLIC BLOOD PRESSURE: 112 MMHG | WEIGHT: 143 LBS | DIASTOLIC BLOOD PRESSURE: 66 MMHG

## 2024-07-24 DIAGNOSIS — C67.9 MALIGNANT NEOPLASM OF URINARY BLADDER, UNSPECIFIED SITE (MULTI): ICD-10-CM

## 2024-07-24 DIAGNOSIS — Z93.6 PRESENCE OF UROSTOMY (MULTI): ICD-10-CM

## 2024-07-24 DIAGNOSIS — E46 PROTEIN-CALORIE MALNUTRITION, UNSPECIFIED SEVERITY (MULTI): ICD-10-CM

## 2024-07-24 DIAGNOSIS — I48.11 LONGSTANDING PERSISTENT ATRIAL FIBRILLATION (MULTI): ICD-10-CM

## 2024-07-24 DIAGNOSIS — K43.9 VENTRAL HERNIA WITHOUT OBSTRUCTION OR GANGRENE: ICD-10-CM

## 2024-07-24 DIAGNOSIS — Z00.00 ROUTINE GENERAL MEDICAL EXAMINATION AT HEALTH CARE FACILITY: Primary | ICD-10-CM

## 2024-07-24 DIAGNOSIS — N18.4 STAGE 4 CHRONIC KIDNEY DISEASE (MULTI): ICD-10-CM

## 2024-07-24 LAB
CREAT UR-MCNC: 95.3 MG/DL (ref 20–320)
PROT UR-ACNC: 48 MG/DL (ref 5–24)
PROT/CREAT UR: 0.5 MG/MG CREAT (ref 0–0.17)

## 2024-07-24 PROCEDURE — 1124F ACP DISCUSS-NO DSCNMKR DOCD: CPT | Performed by: FAMILY MEDICINE

## 2024-07-24 PROCEDURE — G0439 PPPS, SUBSEQ VISIT: HCPCS | Performed by: FAMILY MEDICINE

## 2024-07-24 PROCEDURE — 1160F RVW MEDS BY RX/DR IN RCRD: CPT | Performed by: FAMILY MEDICINE

## 2024-07-24 PROCEDURE — 3074F SYST BP LT 130 MM HG: CPT | Performed by: FAMILY MEDICINE

## 2024-07-24 PROCEDURE — 84156 ASSAY OF PROTEIN URINE: CPT

## 2024-07-24 PROCEDURE — 3078F DIAST BP <80 MM HG: CPT | Performed by: FAMILY MEDICINE

## 2024-07-24 PROCEDURE — 1159F MED LIST DOCD IN RCRD: CPT | Performed by: FAMILY MEDICINE

## 2024-07-24 PROCEDURE — G0009 ADMIN PNEUMOCOCCAL VACCINE: HCPCS | Performed by: FAMILY MEDICINE

## 2024-07-24 PROCEDURE — 99397 PER PM REEVAL EST PAT 65+ YR: CPT | Performed by: FAMILY MEDICINE

## 2024-07-24 PROCEDURE — 90677 PCV20 VACCINE IM: CPT | Performed by: FAMILY MEDICINE

## 2024-07-24 PROCEDURE — 1170F FXNL STATUS ASSESSED: CPT | Performed by: FAMILY MEDICINE

## 2024-07-24 PROCEDURE — 1036F TOBACCO NON-USER: CPT | Performed by: FAMILY MEDICINE

## 2024-07-24 PROCEDURE — 82570 ASSAY OF URINE CREATININE: CPT

## 2024-07-24 ASSESSMENT — PATIENT HEALTH QUESTIONNAIRE - PHQ9
2. FEELING DOWN, DEPRESSED OR HOPELESS: SEVERAL DAYS
10. IF YOU CHECKED OFF ANY PROBLEMS, HOW DIFFICULT HAVE THESE PROBLEMS MADE IT FOR YOU TO DO YOUR WORK, TAKE CARE OF THINGS AT HOME, OR GET ALONG WITH OTHER PEOPLE: NOT DIFFICULT AT ALL
1. LITTLE INTEREST OR PLEASURE IN DOING THINGS: SEVERAL DAYS
SUM OF ALL RESPONSES TO PHQ9 QUESTIONS 1 AND 2: 2

## 2024-07-24 ASSESSMENT — ENCOUNTER SYMPTOMS
NEUROLOGICAL NEGATIVE: 1
PSYCHIATRIC NEGATIVE: 1
ABDOMINAL DISTENTION: 1
CONSTITUTIONAL NEGATIVE: 1
EYES NEGATIVE: 1
ENDOCRINE NEGATIVE: 1
RESPIRATORY NEGATIVE: 1
CARDIOVASCULAR NEGATIVE: 1

## 2024-07-24 ASSESSMENT — ACTIVITIES OF DAILY LIVING (ADL)
TAKING_MEDICATION: INDEPENDENT
DOING_HOUSEWORK: NEEDS ASSISTANCE
GROCERY_SHOPPING: NEEDS ASSISTANCE
BATHING: INDEPENDENT
DRESSING: INDEPENDENT
MANAGING_FINANCES: INDEPENDENT

## 2024-07-24 NOTE — PROGRESS NOTES
"Subjective   Reason for Visit: Sue Hutchinson is an 89 y.o. female here for a Medicare Wellness visit.     Past Medical, Surgical, and Family History reviewed and updated in chart.    Reviewed all medications by prescribing practitioner or clinical pharmacist (such as prescriptions, OTCs, herbal therapies and supplements) and documented in the medical record.    HPI Had surgery for a ventral hernia repair 12 years ago. Was complicated by the urostomy. About three years later it started to bother her. Now it is starting to bother her.     Patient Care Team:  Cristina Carreno DO as PCP - General  Cristina Carreno DO as PCP - [employer] PCP     Review of Systems   Constitutional: Negative.    HENT: Negative.     Eyes: Negative.    Respiratory: Negative.     Cardiovascular: Negative.    Gastrointestinal:  Positive for abdominal distention.   Endocrine: Negative.    Genitourinary: Negative.    Skin: Negative.    Neurological: Negative.    Psychiatric/Behavioral: Negative.         Objective   Vitals:  /66 (BP Location: Right arm, Patient Position: Sitting)   Pulse 68   Temp 36.6 °C (97.8 °F) (Temporal)   Resp 18   Ht 1.702 m (5' 7\")   Wt 64.9 kg (143 lb)   SpO2 96%   BMI 22.40 kg/m²       Physical Exam  Constitutional:       General: She is not in acute distress.     Appearance: Normal appearance.   HENT:      Head: Normocephalic and atraumatic.      Right Ear: Tympanic membrane normal.      Left Ear: Tympanic membrane normal.      Nose: Nose normal.      Mouth/Throat:      Mouth: Mucous membranes are dry.      Pharynx: Oropharynx is clear.   Eyes:      Conjunctiva/sclera: Conjunctivae normal.      Pupils: Pupils are equal, round, and reactive to light.   Neck:      Vascular: No carotid bruit.   Cardiovascular:      Rate and Rhythm: Normal rate and regular rhythm.      Heart sounds: Normal heart sounds.   Pulmonary:      Effort: Pulmonary effort is normal.      Breath sounds: Normal " breath sounds.   Abdominal:      General: Bowel sounds are normal.      Palpations: Abdomen is soft.      Comments: Ventral hernia and urostomy in place.    Musculoskeletal:      Cervical back: Neck supple. No tenderness.   Skin:     General: Skin is warm and dry.   Neurological:      General: No focal deficit present.      Mental Status: She is alert.   Psychiatric:         Mood and Affect: Mood normal.         Behavior: Behavior normal.         Assessment/Plan   Problem List Items Addressed This Visit             ICD-10-CM    Atrial fibrillation (Multi) I48.91     Still in afib. Sees Cardiology         Stage 4 chronic kidney disease (Multi) N18.4     Managed by Nephrology         Malignant neoplasm of urinary bladder (Multi) C67.9     Historical         Presence of urostomy (Multi) Z93.6     No issues with  urostomy         Protein-calorie malnutrition, unspecified severity (Multi) - Primary E46     Other Visit Diagnoses         Codes    Routine general medical examination at health care facility     Z00.00

## 2024-08-01 NOTE — PROGRESS NOTES
Pharmacist Clinic: Cardiology Management    Sue Hutchinson is a 89 y.o. female was referred to Clinical Pharmacy Team for anticoagulation management.     Referring Provider: Schwab, Tracy M, APRN-C*    THIS IS A FOLLOW UP PATIENT APPOINTMENT. AT LAST VISIT ON 5/29/2024 WITH PHARMACIST (Sheridan Huynh).    Appointment was completed by patient's daughter who was reached at . Unable to reach patient, left voicemail at .    REVIEW OF PAST APPNT (IF APPLICABLE):   Overall patient is doing well on Eliquis, with no bleeding or bruising reported  Uses walker for ambulation, due to hip pain, is careful to avoid falls, has not fallen since she started consistently using her walker, avoids activities that may increase her risk of falling  Takes short walk every day outside to maintain strength, does some light yard work  Counseled on importance of adherence to anticoagulation, and risk of blood clots with missed doses, patient reports overall good compliance  Of note, patient's [facility] will need renewed prior to 8/14/2024. Patient's last appointment with referring provider was 7/11/2023, she will need to be seen prior to renewal per the C[facility] policy in order for [facility] P[facility] to be renewed and prescriptions submitted by clinical pharmacy. Messaged provider 8/1/2024 for patient to schedule appointment, will inform patient as well.    Allergies   Allergen Reactions    Ciprofloxacin Unknown and Other     tremers       Past Medical History:   Diagnosis Date    Essential (primary) hypertension 01/05/2023    Benign essential hypertension    Personal history of other diseases of the musculoskeletal system and connective tissue     History of arthritis    Personal history of other specified conditions     History of dizziness       Current Outpatient Medications on File Prior to Visit   Medication Sig Dispense Refill    apixaban (Eliquis) 2.5 mg tablet TAKE 1 TABLET BY MOUTH TWICE A DAY. 180 tablet 3    calcitriol  "(Rocaltrol) 0.25 mcg capsule Take 1 capsule (0.25 mcg) by mouth once daily.      metoprolol tartrate (Lopressor) 25 mg tablet Take 1 tablet (25 mg) by mouth 2 times a day. 1/2 BID      mirtazapine (Remeron) 7.5 mg tablet Take 1 tablet (7.5 mg) by mouth once daily. 90 tablet 3    ondansetron (Zofran) 8 mg tablet Take 1 tablet (8 mg) by mouth every 8 hours if needed.      sertraline (Zoloft) 25 mg tablet Take 1 tablet (25 mg) by mouth once daily. 90 tablet 3     No current facility-administered medications on file prior to visit.         RELEVANT LAB RESULTS  Lab Results   Component Value Date    BILITOT 2.5 (H) 05/11/2023    CALCIUM 9.9 07/17/2024    CO2 23 07/17/2024     (H) 07/17/2024    CREATININE 1.62 (H) 07/17/2024    GLUCOSE 91 07/17/2024    ALKPHOS 37 05/11/2023    K 4.9 07/17/2024    PROT 6.7 05/11/2023     07/17/2024    AST 17 05/11/2023    ALT 13 05/11/2023    BUN 37 (H) 07/17/2024    ANIONGAP 15 07/17/2024    MG 1.97 07/17/2024    PHOS 3.9 07/17/2024    ALBUMIN 4.0 07/17/2024    GFRF 32 (A) 07/17/2023     Lab Results   Component Value Date    TRIG 195 (H) 03/09/2021    CHOL 149 03/09/2021    HDL 31.0 (A) 03/09/2021     No results found for: \"BMCBC\", \"CBCDIF\"     PHARMACEUTICAL ASSESSMENT      Medication Documentation Review Audit       Reviewed by Cristina Carreno DO (Physician) on 07/24/24 at 1125      Medication Order Taking? Sig Documenting Provider Last Dose Status   apixaban (Eliquis) 2.5 mg tablet [id number] Yes TAKE 1 TABLET BY MOUTH TWICE A DAY. Tracy M Schwab, APRN-CNP Taking Active   calcitriol (Rocaltrol) 0.25 mcg capsule [id number] Yes Take 1 capsule (0.25 mcg) by mouth once daily. Historical Provider, MD Taking Active   metoprolol tartrate (Lopressor) 25 mg tablet [id number] Yes Take 1 tablet (25 mg) by mouth 2 times a day. 1/2 BID Historical Provider, MD Taking Active   mirtazapine (Remeron) 7.5 mg tablet [id number] Yes Take 1 tablet (7.5 mg) by mouth once daily. Cristina Carreno, DO " Taking Active   ondansetron (Zofran) 8 mg tablet [id number] Yes Take 1 tablet (8 mg) by mouth every 8 hours if needed. Historical Provider, MD Taking Active   sertraline (Zoloft) 25 mg tablet [id number] Yes Take 1 tablet (25 mg) by mouth once daily. Cristina Carreno DO Taking Active      Discontinued 07/24/24 [id number]                    DISEASE MANAGEMENT ASSESSMENT:     ANTICOAGULATION ASSESSMENT    The ASCVD Risk score (Alexandra DK, et al., 2019) failed to calculate for the following reasons:    The 2019 ASCVD risk score is only valid for ages 40 to 79    DIAGNOSIS: prevention of nonvalvular atrial fibrilliation stroke and systemic embolism  - Patient is projected to be on anticoagulation long term  - [id number] Score: [4] (only included if diagnosis is atrial fibrillation)   Age: [<65 (0)] [65-74 (+1)] [> 75 (+2)]: 2  Sex: [Male/Female (+1)]: 1  CHF history: [No/Yes(+1)]: 0  Hypertension history: [No/Yes(+1)]: 1  Stroke/TIA/thromboembolism history: [No/Yes(+2)]: 0  Vascular disease history (prior MI, peripheral artery disease, aortic plaque): [No/Yes(+1)]: 0  Diabetes history: [No/Yes(+1)]: 0    CURRENT PHARMACOTHERAPY:   Eliquis 2.5 mg BID  Dosage is appropriate for patient with the following criteria:  Age 89 years  Weight 64.9 kg  SCr 1.62 mg/dL (7/17/2024)    RELEVANT PAST MEDICAL HISTORY:   A-fib, HTN, Stage 4 CKD    Affordability/Accessibility: [facility] for Eliquis - active through 8/14/2024  Adherence/Organization: confirms taking twice daily; daughter denies any missed doses  Adverse Reactions: none per daughter  Recent Hospitalizations: None  Recent Falls/Trauma: None  Changes in Tobacco or Alcohol Intake:   Tobacco: none; per daughter does not use  Alcohol: none; per daughter does not use    EDUCATION/COUNSELING:   - Counseled patient on MOA, expectations, duration of therapy, contraindications, administration, and monitoring parameters  - Counseled patient of side effects that are indicative of bleeding such  as dark tarry stool, unexplainable bruising, or vomiting up a coffee ground like substance     Patient Assistance Program (PAP) - RENEWAL     Per regulation, patient is due for their annual renewal for their  PAP application. Patient's application is due for renewal by 2024. Patient understands that if proper documentation is not received before renewal date, they will no longer be able to receive approved medication(s) free of charge. Patient also needs to be seen by referring provider prior to renewal, as >1 year has past since last appointment. Provider aware and office will call patient to schedule.    Application for program to be submitted for the following medications: Eliquis    [facility] Permanent Address: [facility]    Prescription Insurance:   Yes   Members of Household: 1   Files Taxes: No - SSB benefit       Patient will be email financial information to pharmacist directly at [email or web address].    Patient verbally reports monthly or yearly income which is less than 400% federal poverty level    Patient aware this process may take up to 6 weeks.     If approved medication must be filled through [facility] and MEDICATION WILL BE MAILED TO PATIENT.     DISCUSSION/NOTES:   Per patient's daughter, patient is doing well on Eliquis pharmacotherapy. No concerns with adherence or adverse effects at this time. Daughter denies any hospitalizations or falls since last appointment. Patient's daughter is aware VAF will  2024, and patient will need to be seen by provider prior to VAF renewal, as last appointment was >1 year ago. Will have [facility] fill Eliquis for 90 day supply prior to program expiration, so patient does not run out of medication. Gave patient's daughter pharmacist's email and phone number to contact with updates regarding cardiology appointment date as well as income information for renewal submission. Will follow up in 6 weeks to ensure requirements are  met for  PAP renewal.     ASSESSMENT AND PLAN:    Assessment/Plan   Problem List Items Addressed This Visit       Atrial fibrillation (Multi) - Primary    Relevant Orders    Follow Up In Clinical Pharmacy       RECOMMENDATIONS/PLAN    Continue: Eliquis 2.5 mg BID  Follow up: 6 weeks    Next Cardiology Appointment: To be scheduled, last appointment 7/11/2023, messaged referring provider for patient to schedule appointment  Clinical Pharmacist follow up: 9/18/2024  VAF/Application Expiration: Yes    Date: 8/14/2024  Type of Encounter: Nathan Cintron PharmD    Verbal consent to manage patient's drug therapy was obtained from an individual authorized to act on behalf of a patient. They were informed they may decline to participate or withdraw from participation in pharmacy services at any time.    Continue all meds under the continuation of care with the referring provider and clinical pharmacy team.

## 2024-08-02 ENCOUNTER — APPOINTMENT (OUTPATIENT)
Dept: PHARMACY | Facility: HOSPITAL | Age: 89
End: 2024-08-02
Payer: MEDICARE

## 2024-08-02 DIAGNOSIS — I48.11 LONGSTANDING PERSISTENT ATRIAL FIBRILLATION (MULTI): Primary | ICD-10-CM

## 2024-08-02 PROCEDURE — RXMED WILLOW AMBULATORY MEDICATION CHARGE

## 2024-08-02 NOTE — Clinical Note
Chino Wood, I spoke with Sue's daughter today regarding Eliquis. No concerns regarding bruising/bleeding at this time. Daughter will call the office to schedule an appointment in order for us to renew her [facility] PAP. Daughter is also going to have [facility] fill the Eliquis for 90 days so she should not run out of medication prior to us being able to renew (expiration 8/14/2024). I scheduled a follow up for 6 weeks to make sure she has an appointment with you and then I can renew her assistance. Thank you!

## 2024-08-06 ENCOUNTER — PHARMACY VISIT (OUTPATIENT)
Dept: PHARMACY | Facility: CLINIC | Age: 89
End: 2024-08-06
Payer: MEDICARE

## 2024-09-11 ENCOUNTER — APPOINTMENT (OUTPATIENT)
Dept: CARDIOLOGY | Facility: CLINIC | Age: 89
End: 2024-09-11
Payer: MEDICARE

## 2024-09-11 VITALS
HEART RATE: 56 BPM | WEIGHT: 142.6 LBS | RESPIRATION RATE: 18 BRPM | SYSTOLIC BLOOD PRESSURE: 98 MMHG | OXYGEN SATURATION: 98 % | HEIGHT: 67 IN | BODY MASS INDEX: 22.38 KG/M2 | DIASTOLIC BLOOD PRESSURE: 56 MMHG

## 2024-09-11 DIAGNOSIS — I48.11 LONGSTANDING PERSISTENT ATRIAL FIBRILLATION (MULTI): Primary | ICD-10-CM

## 2024-09-11 DIAGNOSIS — Z79.01 CHRONIC ANTICOAGULATION: ICD-10-CM

## 2024-09-11 PROCEDURE — 99214 OFFICE O/P EST MOD 30 MIN: CPT | Performed by: NURSE PRACTITIONER

## 2024-09-11 PROCEDURE — 1159F MED LIST DOCD IN RCRD: CPT | Performed by: NURSE PRACTITIONER

## 2024-09-11 PROCEDURE — 1160F RVW MEDS BY RX/DR IN RCRD: CPT | Performed by: NURSE PRACTITIONER

## 2024-09-11 PROCEDURE — 3078F DIAST BP <80 MM HG: CPT | Performed by: NURSE PRACTITIONER

## 2024-09-11 PROCEDURE — 3074F SYST BP LT 130 MM HG: CPT | Performed by: NURSE PRACTITIONER

## 2024-09-11 PROCEDURE — 1036F TOBACCO NON-USER: CPT | Performed by: NURSE PRACTITIONER

## 2024-09-11 RX ORDER — ACETAMINOPHEN 325 MG/1
1 TABLET ORAL EVERY 4 HOURS PRN
COMMUNITY

## 2024-09-11 NOTE — PROGRESS NOTES
Name : Sue Hutchinson   : 1934   MRN : [id number]   ENC Date : 2024    CC: Annual Exam, Atrial Fibrillation, Hypertension, and Bradycardia     HPI:    Sue Hutchinson is a 89 y.o. female with PMHx sig for Afib on DOAC (2018), HTN & CKD who presents with her Daughter today for annual cardiovascular follow up.     She is doing relatively well. Her biggest complaint today is mobility. She has to have 1 hand on her walker at all times so she doesn't fall which makes daily tasks difficult to do. Though, she continues to stay active. She was helping her daughter in the flowerbed yesterday, cutting cardboard to lay over the weeds.      CV Diagnostics:  Echo 2018  Lexiscan 2018    ROS: unless otherwise noted in the history of present illness, all other systems were reviewed and they are negative for complaints     Allergies:  Ciprofloxacin    Current Outpatient Medications   Medication Instructions    acetaminophen (TylenoL) 325 mg tablet 1 tablet, oral, Every 4 hours PRN    apixaban (Eliquis) 2.5 mg tablet TAKE 1 TABLET BY MOUTH TWICE A DAY.    calcitriol (ROCALTROL) 0.25 mcg, oral, Daily    mirtazapine (REMERON) 7.5 mg, oral, Daily        Last Labs:  CBC  Lab Results   Component Value Date    WBC 8.3 2024    HGB 13.4 2024    HCT 42.1 2024    MCV 88 2024     2024       CMP  Lab Results   Component Value Date    CALCIUM 9.9 2024    PHOS 3.9 2024    PROT 6.7 2023    ALBUMIN 4.0 2024    AST 17 2023    ALT 13 2023    ALKPHOS 37 2023    BILITOT 2.5 (H) 2023       BMP   Lab Results   Component Value Date     2024    K 4.9 2024     (H) 2024    CO2 23 2024    GLUCOSE 91 2024    BUN 37 (H) 2024    CREATININE 1.62 (H) 2024       LIPID PANEL   Lab Results   Component Value Date    CHOL 149 2021    TRIG 195 (H) 2021    HDL 31.0 (A) 2021    CHHDL 4.8 2021  "   LDLF 79 03/09/2021    VLDL 39 03/09/2021       RENAL FUNCTION PANEL   Lab Results   Component Value Date    GLUCOSE 91 07/17/2024     07/17/2024    K 4.9 07/17/2024     (H) 07/17/2024    CO2 23 07/17/2024    ANIONGAP 15 07/17/2024    BUN 37 (H) 07/17/2024    CREATININE 1.62 (H) 07/17/2024    CALCIUM 9.9 07/17/2024    PHOS 3.9 07/17/2024    ALBUMIN 4.0 07/17/2024        No results found for: \"BNP\", \"HGBA1C\"  I have reviewed the above labs & diagnostics    Last Recorded Vitals:  Vitals:    09/11/24 1106   BP: 98/56   BP Location: Left arm   Patient Position: Sitting   Pulse: 56   Resp: 18   SpO2: 98%   Weight: 64.7 kg (142 lb 9.6 oz)   Height: 1.702 m (5' 7\")     Physical Exam:  On exam Ms. Sue Hutchinson appears her stated age, is alert and oriented x3, and in no acute distress. Her sclera are anicteric and her oropharynx has moist mucous membranes. Her neck is supple and without thyromegaly. The JVP is ~5 cm of water above the right atrium. Her cardiac exam has irregular rhythm, normal S1, S2. No S3/4. There are no murmurs. Her lungs are clear to auscultation bilaterally and there is no dullness to percussion. Her abdomen is soft, nontender with normoactive bowel sounds. There is no HJR. The extremities are warm and without edema. The skin is dry. There is no rash present. The distal pulses are 2-3+ in all four extremities. Her mood and affect are appropriate for todays encounter.     Assessment/Plan:  Permanent atrial fibrillation. No palpitations. Rate is still controlled off of BB. Tolerating DOAC.    Anticoagulation, long term. No embolic events nor issues with bleeding.  - counseled on the bleeding risk with DOAC and how to minimize it.     Follow up in 1 year or as needed     Tracy M Schwab, APRN-CNP  "

## 2024-09-18 ENCOUNTER — APPOINTMENT (OUTPATIENT)
Dept: PHARMACY | Facility: HOSPITAL | Age: 89
End: 2024-09-18
Payer: MEDICARE

## 2024-09-18 DIAGNOSIS — I48.11 LONGSTANDING PERSISTENT ATRIAL FIBRILLATION (MULTI): ICD-10-CM

## 2024-09-18 NOTE — Clinical Note
Mary Wood, Today's appointment was 6 week follow up regarding Eliquis. Patient is doing well, reports no abnormal bruising or bleeding. Reports she misses 1 dose per month at the most if her routine is out of the ordinary, does not typically miss her Eliquis. No hospitalizations or falls to report at this time. Patient does live alone, but her daughter checks on her weekly. Patient states she uses caution when moving around and does not go anywhere without her walker. Discussed having a plan in place in case of a fall, to ensure she is able to contact someone if she is unable to get up. Patient confirms understanding. We were also able to submit her  PAP renewal today to ensure continued cost assistance. Will follow up in 6 months.

## 2024-09-18 NOTE — ASSESSMENT & PLAN NOTE
No concerns regarding abnormal bruising/bleeding with Eliquis. Dose is appropriate for age, weight, and renal function. Will continue current dose and follow up in 6 months.

## 2024-09-18 NOTE — PROGRESS NOTES
Pharmacist Clinic: Cardiology Management    Sue Hutchinson is a 89 y.o. female was referred to Clinical Pharmacy Team for anticoagulation management.     Referring Provider: Schwab, Tracy M, APRN-C*    THIS IS A FOLLOW UP PATIENT APPOINTMENT. AT LAST VISIT ON 2024 WITH PHARMACIST (Caren Cintron).    Appointment was completed by the patient who was reached at .    REVIEW OF PAST APPNT (IF APPLICABLE):   Per patient's daughter, patient is doing well on Eliquis pharmacotherapy. No concerns with adherence or adverse effects at this time. Daughter denies any hospitalizations or falls since last appointment. Patient's daughter is aware VAF will  2024, and patient will need to be seen by provider prior to VAF renewal, as last appointment was >1 year ago. Will have [facility] fill Eliquis for 90 day supply prior to program expiration, so patient does not run out of medication. Gave patient's daughter pharmacist's email and phone number to contact with updates regarding cardiology appointment date as well as income information for renewal submission. Will follow up in 6 weeks to ensure requirements are met for  PAP renewal.   Interim History: Patient seen by referring provider 2024    Allergies   Allergen Reactions    Ciprofloxacin Unknown and Other     tremers       Past Medical History:   Diagnosis Date    Essential (primary) hypertension 2023    Benign essential hypertension    Personal history of other diseases of the musculoskeletal system and connective tissue     History of arthritis    Personal history of other specified conditions     History of dizziness       Current Outpatient Medications on File Prior to Visit   Medication Sig Dispense Refill    acetaminophen (TylenoL) 325 mg tablet Take 1 tablet (325 mg) by mouth every 4 hours if needed.      calcitriol (Rocaltrol) 0.25 mcg capsule Take 1 capsule (0.25 mcg) by mouth once daily.      mirtazapine (Remeron) 7.5 mg  "tablet Take 1 tablet (7.5 mg) by mouth once daily. 90 tablet 3    [DISCONTINUED] apixaban (Eliquis) 2.5 mg tablet TAKE 1 TABLET BY MOUTH TWICE A DAY. 180 tablet 3     No current facility-administered medications on file prior to visit.         RELEVANT LAB RESULTS  Lab Results   Component Value Date    BILITOT 2.5 (H) 05/11/2023    CALCIUM 9.9 07/17/2024    CO2 23 07/17/2024     (H) 07/17/2024    CREATININE 1.62 (H) 07/17/2024    GLUCOSE 91 07/17/2024    ALKPHOS 37 05/11/2023    K 4.9 07/17/2024    PROT 6.7 05/11/2023     07/17/2024    AST 17 05/11/2023    ALT 13 05/11/2023    BUN 37 (H) 07/17/2024    ANIONGAP 15 07/17/2024    MG 1.97 07/17/2024    PHOS 3.9 07/17/2024    ALBUMIN 4.0 07/17/2024    GFRF 32 (A) 07/17/2023     Lab Results   Component Value Date    TRIG 195 (H) 03/09/2021    CHOL 149 03/09/2021    HDL 31.0 (A) 03/09/2021     No results found for: \"BMCBC\", \"CBCDIF\"     PHARMACEUTICAL ASSESSMENT    Drug Interactions? No    Medication Documentation Review Audit       Reviewed by Tracy M Schwab, APRN-CNP (Nurse Practitioner) on 09/11/24 at 1109      Medication Order Taking? Sig Documenting Provider Last Dose Status   acetaminophen (TylenoL) 325 mg tablet [id number] Yes Take 1 tablet (325 mg) by mouth every 4 hours if needed. Historical Provider, MD Taking Active   apixaban (Eliquis) 2.5 mg tablet [id number] Yes TAKE 1 TABLET BY MOUTH TWICE A DAY. Tracy M Schwab, APRN-CNP Taking Active   calcitriol (Rocaltrol) 0.25 mcg capsule [id number] Yes Take 1 capsule (0.25 mcg) by mouth once daily. Historical Provider, MD Taking Active     Discontinued 09/11/24 1104   mirtazapine (Remeron) 7.5 mg tablet [id number] Yes Take 1 tablet (7.5 mg) by mouth once daily. Cristina Carreno DO Taking Active     Discontinued 09/11/24 1105     Discontinued 09/11/24 1109                    DISEASE MANAGEMENT ASSESSMENT:     ANTICOAGULATION ASSESSMENT    The ASCVD Risk score (Alexandra RAMEY, et al., 2019) failed to calculate for the " following reasons:    The 2019 ASCVD risk score is only valid for ages 40 to 79    DIAGNOSIS: prevention of nonvalvular atrial fibrilliation stroke and systemic embolism  - Patient is projected to be on anticoagulation long term  - [id number] Score: [4] (only included if diagnosis is atrial fibrillation)   Age: [<65 (0)] [65-74 (+1)] [> 75 (+2)]: 2  Sex: [Male/Female (+1)]: 1  CHF history: [No/Yes(+1)]: 0  Hypertension history: [No/Yes(+1)]: 1  Stroke/TIA/thromboembolism history: [No/Yes(+2)]: 0  Vascular disease history (prior MI, peripheral artery disease, aortic plaque): [No/Yes(+1)]: 0  Diabetes history: [No/Yes(+1)]: 0    CURRENT PHARMACOTHERAPY:   Eliquis 2.5 mg BID  89 y/o  64.7 kg  Scr 1.62 mg/dL    RELEVANT PAST MEDICAL HISTORY:   A-fib, HTN, Stage 4 CKD     Affordability/Accessibility:  PAP renewal in progress; submitted 9/18/2024  Adherence/Organization: confirms taking Eliquis twice daily; reports missing about 1 dose per month at the most if her routine is out of the ordinary  Adverse Reactions: no abnormal bruising or bleeding  Recent Hospitalizations: none  Recent Falls/Trauma: none, patient lives alone but daughter comes to see her about once per week. Patient states she uses caution when moving around as to not fall, and uses a walker. States she does not go anywhere without her walker.  Changes in Tobacco or Alcohol Intake:   Tobacco: none, does not use  Alcohol: none, does not use    EDUCATION/COUNSELING:   - Counseled patient on MOA, expectations, duration of therapy, contraindications, administration, and monitoring parameters  - Counseled patient of side effects that are indicative of bleeding such as dark tarry stool, unexplainable bruising, or vomiting up a coffee ground like substance      DISCUSSION/NOTES:   Today's appointment was 6 week follow up regarding anticoagulation pharmacotherapy.   Patient is doing well on Eliquis, reports no abnormal bruising or bleeding. Reports she misses  1 dose per month at the most if her routine is out of the ordinary, does not typically miss her Eliquis.   No hospitalizations or falls to report at this time. Patient does live alone, but her daughter checks on her weekly. Patient states she uses caution when moving around and does not go anywhere without her walker. Discussed having a plan in place in case of a fall, to ensure she is able to contact someone if she is unable to get up. Patient confirms understanding.  Patient's  PAP renewal has been submitted as of today's appointment, determination pending paid claim on 10/9/2024.  Will follow up in 6 months.    ASSESSMENT AND PLAN:    Assessment/Plan   Problem List Items Addressed This Visit       Atrial fibrillation (Multi)     No concerns regarding abnormal bruising/bleeding with Eliquis. Dose is appropriate for age, weight, and renal function. Will continue current dose and follow up in 6 months.         Relevant Orders    Follow Up In Clinical Pharmacy         RECOMMENDATIONS/PLAN    Continue: Eliquis 2.5 mg BID  Follow up: 6 months    Last Appnt with Referring Provider: 9/11/2024  Next Appnt with Referring Provider: 9/11/2025  Clinical Pharmacist follow up: 3/17/2025  VAF/Application Expiration: Yes    Date: renewal pending, submitted 9/18/2024  Type of Encounter: Nathan Cintron PharmD    Verbal consent to manage patient's drug therapy was obtained from the patient . They were informed they may decline to participate or withdraw from participation in pharmacy services at any time.    Continue all meds under the continuation of care with the referring provider and clinical pharmacy team.

## 2024-10-09 ENCOUNTER — TELEPHONE (OUTPATIENT)
Dept: PHARMACY | Facility: HOSPITAL | Age: 89
End: 2024-10-09
Payer: MEDICARE

## 2024-10-09 PROCEDURE — RXMED WILLOW AMBULATORY MEDICATION CHARGE

## 2024-10-09 NOTE — TELEPHONE ENCOUNTER
Patient Assistance Program Approval:     We are pleased to inform you that your application for assistance has been approved.     This approval is valid through  10/9/2025  as long as the following criteria continue to be satisfied:     Your medication (Eliquis) remains covered under your current insurance plan.   Your prescriber does not discontinue therapy.   You do not seek reimbursement from any other private or government-funded programs for the  medication.    Under this program, the pharmacy will first bill your insurance plan for your indemnified specified medication. The [employer] Assistance Fund will then offset your copay balance, so that your out-of pocket expense for your specialty medication will be $0.00.    Caren Cintron, PharmD

## 2024-10-11 ENCOUNTER — PHARMACY VISIT (OUTPATIENT)
Dept: PHARMACY | Facility: CLINIC | Age: 89
End: 2024-10-11
Payer: MEDICARE

## 2025-02-05 PROCEDURE — RXMED WILLOW AMBULATORY MEDICATION CHARGE

## 2025-02-07 ENCOUNTER — PHARMACY VISIT (OUTPATIENT)
Dept: PHARMACY | Facility: CLINIC | Age: OVER 89
End: 2025-02-07
Payer: MEDICARE

## 2025-03-17 ENCOUNTER — APPOINTMENT (OUTPATIENT)
Dept: PHARMACY | Facility: HOSPITAL | Age: OVER 89
End: 2025-03-17
Payer: MEDICARE

## 2025-04-02 NOTE — PROGRESS NOTES
Pharmacist Clinic: Cardiology Management    Sue Hutchinson is a 89 y.o. female was referred to Clinical Pharmacy Team for anticoagulation management.     Referring Provider: Schwab, Tracy M, APRN-C*    THIS IS A FOLLOW UP PATIENT APPOINTMENT. AT LAST VISIT ON 9/18/2024 WITH PHARMACIST (Caren Cintron).    Appointment was completed by the patient who was reached at .    REVIEW OF PAST APPNT (IF APPLICABLE):   Today's appointment was 6 week follow up regarding anticoagulation pharmacotherapy.   Patient is doing well on Eliquis, reports no abnormal bruising or bleeding. Reports she misses 1 dose per month at the most if her routine is out of the ordinary, does not typically miss her Eliquis.   No hospitalizations or falls to report at this time. Patient does live alone, but her daughter checks on her weekly. Patient states she uses caution when moving around and does not go anywhere without her walker. Discussed having a plan in place in case of a fall, to ensure she is able to contact someone if she is unable to get up. Patient confirms understanding.  Patient's  PAP renewal has been submitted as of today's appointment, determination pending paid claim on 10/9/2024.  Will follow up in 6 months.    Allergies   Allergen Reactions    Ciprofloxacin Unknown and Other     tremers       Past Medical History:   Diagnosis Date    Essential (primary) hypertension 01/05/2023    Benign essential hypertension    Personal history of other diseases of the musculoskeletal system and connective tissue     History of arthritis    Personal history of other specified conditions     History of dizziness       Current Outpatient Medications on File Prior to Visit   Medication Sig Dispense Refill    acetaminophen (TylenoL) 325 mg tablet Take 1 tablet (325 mg) by mouth every 4 hours if needed.      apixaban (Eliquis) 2.5 mg tablet TAKE 1 TABLET BY MOUTH TWICE A DAY. 180 tablet 3    calcitriol (Rocaltrol) 0.25 mcg capsule Take  "1 capsule (0.25 mcg) by mouth once daily.      mirtazapine (Remeron) 7.5 mg tablet Take 1 tablet (7.5 mg) by mouth once daily. 90 tablet 3     No current facility-administered medications on file prior to visit.         RELEVANT LAB RESULTS  Lab Results   Component Value Date    BILITOT 2.5 (H) 05/11/2023    CALCIUM 9.9 07/17/2024    CO2 23 07/17/2024     (H) 07/17/2024    CREATININE 1.62 (H) 07/17/2024    GLUCOSE 91 07/17/2024    ALKPHOS 37 05/11/2023    K 4.9 07/17/2024    PROT 6.7 05/11/2023     07/17/2024    AST 17 05/11/2023    ALT 13 05/11/2023    BUN 37 (H) 07/17/2024    ANIONGAP 15 07/17/2024    MG 1.97 07/17/2024    PHOS 3.9 07/17/2024    ALBUMIN 4.0 07/17/2024    GFRF 32 (A) 07/17/2023     Lab Results   Component Value Date    TRIG 195 (H) 03/09/2021    CHOL 149 03/09/2021    HDL 31.0 (A) 03/09/2021     No results found for: \"BMCBC\", \"CBCDIF\"     PHARMACEUTICAL ASSESSMENT    Drug Interactions? No    Medication Documentation Review Audit       Reviewed by Tracy M Schwab, APRN-CNP (Nurse Practitioner) on 09/11/24 at 1109      Medication Order Taking? Sig Documenting Provider Last Dose Status   acetaminophen (TylenoL) 325 mg tablet [id number] Yes Take 1 tablet (325 mg) by mouth every 4 hours if needed. Historical Provider, MD Taking Active   apixaban (Eliquis) 2.5 mg tablet [id number] Yes TAKE 1 TABLET BY MOUTH TWICE A DAY. Tracy M Schwab, APRN-CNP Taking Active   calcitriol (Rocaltrol) 0.25 mcg capsule [id number] Yes Take 1 capsule (0.25 mcg) by mouth once daily. Historical Provider, MD Taking Active     Discontinued 09/11/24 1104   mirtazapine (Remeron) 7.5 mg tablet [id number] Yes Take 1 tablet (7.5 mg) by mouth once daily. Cristina Carreno,  Taking Active     Discontinued 09/11/24 1105     Discontinued 09/11/24 1109                    DISEASE MANAGEMENT ASSESSMENT:     ANTICOAGULATION ASSESSMENT    The ASCVD Risk score (Alexandra RAMEY, et al., 2019) failed to calculate for the following reasons:    " The 2019 ASCVD risk score is only valid for ages 40 to 79    DIAGNOSIS: prevention of nonvalvular atrial fibrilliation stroke and systemic embolism  - Patient is projected to be on anticoagulation long term  - [id number] Score: [4] (only included if diagnosis is atrial fibrillation)   Age: [<65 (0)] [65-74 (+1)] [> 75 (+2)]: 2  Sex: [Male/Female (+1)]: 1  CHF history: [No/Yes(+1)]: 0  Hypertension history: [No/Yes(+1)]: 1  Stroke/TIA/thromboembolism history: [No/Yes(+2)]: 0  Vascular disease history (prior MI, peripheral artery disease, aortic plaque): [No/Yes(+1)]: 0  Diabetes history: [No/Yes(+1)]: 0    CURRENT PHARMACOTHERAPY:   Eliquis 2.5 mg BID  89 y/o  64.7 kg  Scr 1.62 mg/dL    RELEVANT PAST MEDICAL HISTORY:   A-fib, HTN, Stage 4 CKD     Affordability/Accessibility:  PAP active through 10/9/2025  Adherence/Organization: confirms taking Eliquis twice daily  Adverse Reactions: no abnormal bruising or bleeding; no dark/tarry stools  Recent Hospitalizations: none  Recent Falls/Trauma: no recent falls, but has stumbled recently. Patient lives alone, daughter usually comes to see her about once per 1-2 weeks. Has been unable to visit recently due to busy work schedule. Patient states she uses caution when moving around as to not fall, and does not go anywhere without her walker.  Changes in Tobacco or Alcohol Intake:   Tobacco: none, does not use  Alcohol: none, does not use    EDUCATION/COUNSELING:   - Counseled patient on MOA, expectations, duration of therapy, contraindications, administration, and monitoring parameters  - Counseled patient of side effects that are indicative of bleeding such as dark tarry stool, unexplainable bruising, or vomiting up a coffee ground like substance      DISCUSSION/NOTES:   Today's appointment was 6 month follow up regarding anticoagulation with Eliquis.   Sue is doing well, reports no abnormal bruising or bleeding. No recent hospitalizations to note.   She has not fallen  recently, but has had a few close calls. She ensures she uses her walker, and also wears a Life Alert necklace. Reminded patient to call her provider for any falls that may occur due to the increased risk of bleeding with Eliquis.  Will follow up in 5 months for  PAP renewal. Reminded patient of annual cardiology appointment on 9/11/2025, as well as annual appointment requirement for  PAP application.    ASSESSMENT AND PLAN:    Assessment/Plan   Problem List Items Addressed This Visit       Atrial fibrillation (Multi)     Sue continues on anticoagulation with Eliquis. Dosage is appropriate for age, weight, and renal function.         Relevant Orders    Referral to Clinical Pharmacy       RECOMMENDATIONS/PLAN    Continue: Eliquis 2.5 mg BID  Follow up: 5 months    Last Appnt with Referring Provider: 9/11/2024  Next Appnt with Referring Provider: 9/11/2025  Clinical Pharmacist follow up: 9/9/2025  VAF/Application Expiration: Yes    Date: 10/9/2025  Type of Encounter: Nathan Cintron PharmD    Verbal consent to manage patient's drug therapy was obtained from the patient . They were informed they may decline to participate or withdraw from participation in pharmacy services at any time.    Continue all meds under the continuation of care with the referring provider and clinical pharmacy team.

## 2025-04-04 ENCOUNTER — APPOINTMENT (OUTPATIENT)
Dept: PHARMACY | Facility: HOSPITAL | Age: OVER 89
End: 2025-04-04
Payer: MEDICARE

## 2025-04-04 DIAGNOSIS — I48.11 LONGSTANDING PERSISTENT ATRIAL FIBRILLATION (MULTI): ICD-10-CM

## 2025-04-04 NOTE — Clinical Note
Mary Wood Sue is doing well with Eliquis, reports no abnormal bruising or bleeding. No recent hospitalizations to note. She has not fallen recently, but has had a few close calls. She ensures she uses her walker, and also wears a Life Alert necklace in case she were to fall.  Will follow up in 5 months for  PAP renewal.

## 2025-04-04 NOTE — ASSESSMENT & PLAN NOTE
Sue continues on anticoagulation with Eliquis. Dosage is appropriate for age, weight, and renal function.

## 2025-06-11 DIAGNOSIS — F32.A DEPRESSION, UNSPECIFIED DEPRESSION TYPE: ICD-10-CM

## 2025-06-11 RX ORDER — MIRTAZAPINE 7.5 MG/1
7.5 TABLET, FILM COATED ORAL DAILY
Qty: 90 TABLET | Refills: 3 | Status: SHIPPED | OUTPATIENT
Start: 2025-06-11 | End: 2026-06-11

## 2025-06-11 NOTE — TELEPHONE ENCOUNTER
PT called to mirtazipine Please send to [facility]  Pt has appointment for 7/24/25  Please send as soon as possible her daughter is coming and can pick it up today.

## 2025-06-24 PROCEDURE — RXMED WILLOW AMBULATORY MEDICATION CHARGE

## 2025-06-26 ENCOUNTER — PHARMACY VISIT (OUTPATIENT)
Dept: PHARMACY | Facility: CLINIC | Age: OVER 89
End: 2025-06-26
Payer: MEDICARE

## 2025-07-24 ENCOUNTER — APPOINTMENT (OUTPATIENT)
Dept: PRIMARY CARE | Facility: CLINIC | Age: OVER 89
End: 2025-07-24
Payer: MEDICARE

## 2025-09-03 ENCOUNTER — APPOINTMENT (OUTPATIENT)
Facility: CLINIC | Age: OVER 89
End: 2025-09-03
Payer: MEDICARE

## 2025-09-03 PROBLEM — I48.91 ATRIAL FIBRILLATION (MULTI): Status: RESOLVED | Noted: 2023-05-17 | Resolved: 2025-09-03

## 2025-09-03 ASSESSMENT — ACTIVITIES OF DAILY LIVING (ADL)
DOING_HOUSEWORK: NEEDS ASSISTANCE
GROCERY_SHOPPING: NEEDS ASSISTANCE
MANAGING_FINANCES: INDEPENDENT
TAKING_MEDICATION: INDEPENDENT
DRESSING: INDEPENDENT
BATHING: INDEPENDENT

## 2025-09-03 ASSESSMENT — PATIENT HEALTH QUESTIONNAIRE - PHQ9
1. LITTLE INTEREST OR PLEASURE IN DOING THINGS: NOT AT ALL
2. FEELING DOWN, DEPRESSED OR HOPELESS: NOT AT ALL
SUM OF ALL RESPONSES TO PHQ9 QUESTIONS 1 AND 2: 0

## 2025-09-03 ASSESSMENT — ENCOUNTER SYMPTOMS
LOSS OF SENSATION IN FEET: 0
OCCASIONAL FEELINGS OF UNSTEADINESS: 1
DEPRESSION: 0

## 2025-09-09 ENCOUNTER — APPOINTMENT (OUTPATIENT)
Dept: PHARMACY | Facility: HOSPITAL | Age: OVER 89
End: 2025-09-09
Payer: MEDICARE

## 2025-09-11 ENCOUNTER — APPOINTMENT (OUTPATIENT)
Dept: CARDIOLOGY | Facility: CLINIC | Age: OVER 89
End: 2025-09-11
Payer: MEDICARE

## 2026-09-04 ENCOUNTER — APPOINTMENT (OUTPATIENT)
Facility: CLINIC | Age: OVER 89
End: 2026-09-04
Payer: MEDICARE